# Patient Record
Sex: FEMALE | Race: WHITE
[De-identification: names, ages, dates, MRNs, and addresses within clinical notes are randomized per-mention and may not be internally consistent; named-entity substitution may affect disease eponyms.]

---

## 2022-05-09 ENCOUNTER — APPOINTMENT (OUTPATIENT)
Dept: ORTHOPEDIC SURGERY | Facility: CLINIC | Age: 72
End: 2022-05-09
Payer: COMMERCIAL

## 2022-05-09 VITALS — BODY MASS INDEX: 23.19 KG/M2 | WEIGHT: 126 LBS | HEIGHT: 62 IN

## 2022-05-09 DIAGNOSIS — S83.231A COMPLEX TEAR OF MEDIAL MENISCUS, CURRENT INJURY, RIGHT KNEE, INITIAL ENCOUNTER: ICD-10-CM

## 2022-05-09 DIAGNOSIS — I10 ESSENTIAL (PRIMARY) HYPERTENSION: ICD-10-CM

## 2022-05-09 PROBLEM — Z00.00 ENCOUNTER FOR PREVENTIVE HEALTH EXAMINATION: Status: ACTIVE | Noted: 2022-05-09

## 2022-05-09 PROCEDURE — 99213 OFFICE O/P EST LOW 20 MIN: CPT

## 2022-05-09 NOTE — HISTORY OF PRESENT ILLNESS
[5] : 5 [4] : 4 [Dull/Aching] : dull/aching [Localized] : localized [Occasional] : occasional [Retired] : Work status: retired [de-identified] : 05/09/22: Here to f/up right knee. Reports continued right knee pain. denies n/t. Reprots that pain can come and go, when present the pain alters her walking gait. Pain bothersome at night. Has been icing, resting.  Attending PT> \par 03/28/22: here for f/up for right knee. still with pain and discomfort. difficulty with steps. \par 3/22/22: Here to f/up right knee. After last visit she started experiencing worsening right knee pain, was seen in [facility] and received a csi without relief. [] : Post Surgical Visit: no [FreeTextEntry1] : R Knee [FreeTextEntry3] : NA Chronic [FreeTextEntry5] : Pt is a 71yr F in for eval of the right  knee, pt states NKI, pt states pain is chronic [de-identified] : 0 [de-identified] : Physical therapy 2x wkly\par CSI [de-identified] : [employer]

## 2022-05-09 NOTE — DISCUSSION/SUMMARY
[de-identified] : 71f with right knee djd, complex mmt. Persistent pain, has tried and failed csi. Has also attended physical therapy\par 1) request auth for series of visco injections for the right knee \par 2) c/w pt and hep \par 3) cryotherapy, rest and activity modification\par 4) rtc with auth for injections\par \par Entered by Gabrielle Mullins acting as scribe.\par

## 2022-05-09 NOTE — PHYSICAL EXAM
[Right] : right knee [NL (0)] : extension 0 degrees [] : no atrophy [TWNoteComboBox7] : flexion 125 degrees

## 2022-05-13 RX ORDER — SODIUM HYALURONATE INTRA-ARTICULAR SOLN PREF SYR 25 MG/2.5ML 25/2.5 MG/ML
25 SOLUTION PREFILLED SYRINGE INTRAARTICULAR WEEKLY
Qty: 5 | Refills: 0 | Status: ACTIVE | COMMUNITY
Start: 2022-05-13 | End: 1900-01-01

## 2022-06-16 ENCOUNTER — APPOINTMENT (OUTPATIENT)
Dept: ORTHOPEDIC SURGERY | Facility: CLINIC | Age: 72
End: 2022-06-16
Payer: COMMERCIAL

## 2022-06-16 VITALS — HEIGHT: 62 IN | BODY MASS INDEX: 21.9 KG/M2 | WEIGHT: 119 LBS

## 2022-06-16 PROCEDURE — 99212 OFFICE O/P EST SF 10 MIN: CPT | Mod: 25

## 2022-06-16 PROCEDURE — J3490M: CUSTOM

## 2022-06-16 PROCEDURE — 20611 DRAIN/INJ JOINT/BURSA W/US: CPT

## 2022-06-16 NOTE — PROCEDURE
[Large Joint Injection] : Large joint injection [Right] : of the right [Knee] : knee [Pain] : pain [Inflammation] : inflammation [X-ray evidence of Osteoarthritis on this or prior visit] : x-ray evidence of Osteoarthritis on this or prior visit [Betadine] : betadine [Ethyl Chloride sprayed topically] : ethyl chloride sprayed topically [Sterile technique used] : sterile technique used [Supartz] : Supartz [#1] : series #1 [Call if redness, pain or fever occur] : call if redness, pain or fever occur [Apply ice for 15min out of every hour for the next 12-24 hours as tolerated] : apply ice for 15 minutes out of every hour for the next 12-24 hours as tolerated [Previous OTC use and PT nontherapeutic] : patient has tried OTC's including aspirin, Ibuprofen, Aleve, etc or prescription NSAIDS, and/or exercises at home and/or physical therapy without satisfactory response [Patient had decreased mobility in the joint] : patient had decreased mobility in the joint [Risks, benefits, alternatives discussed / Verbal consent obtained] : the risks benefits, and alternatives have been discussed, and verbal consent was obtained [Prior failure or difficult injection] : prior failure or difficult injection

## 2022-06-16 NOTE — HISTORY OF PRESENT ILLNESS
[9] : 9 [4] : 4 [Dull/Aching] : dull/aching [Localized] : localized [Constant] : constant [Rest] : rest [Meds] : meds [Standing] : standing [Walking] : walking [Exercising] : exercising [Stairs] : stairs [Retired] : Work status: retired [1] : 1 [Supartz] : Supartz [de-identified] : 06/16/22: Here to f/up right knee and Supartz #1\par 05/09/22: Here to f/up right knee. Reports continued right knee pain. denies n/t. Reprots that pain can come and go, when present the pain alters her walking gait. Pain bothersome at night. Has been icing, resting.  Attending PT> \par 03/28/22: here for f/up for right knee. still with pain and discomfort. difficulty with steps. \par 3/22/22: Here to f/up right knee. After last visit she started experiencing worsening right knee pain, was seen in [facility] and received a csi without relief. [] : Post Surgical Visit: no [FreeTextEntry1] : R Knee [FreeTextEntry3] : NA Chronic [FreeTextEntry5] : Pt is a 71yr F in for eval of the right  knee, pt states NKI, pt states pain is chronic [de-identified] : ~3 weeks [de-identified] : Physical therapy 2x wkly up to 3 weeks ago\par  [de-identified] : [employer] [de-identified] : right knee

## 2022-06-16 NOTE — DISCUSSION/SUMMARY
[de-identified] : 71f with right knee djd, Supartz #1 Injection tolerated well. Post injection instructions reviewed.\par 1) wbat, cryotherapy\par 2) rtc 1 week\par \par Entered by Gabrielle Mullins acting as scribe.\par \par

## 2022-06-24 ENCOUNTER — APPOINTMENT (OUTPATIENT)
Dept: ORTHOPEDIC SURGERY | Facility: CLINIC | Age: 72
End: 2022-06-24
Payer: COMMERCIAL

## 2022-06-24 PROCEDURE — 99212 OFFICE O/P EST SF 10 MIN: CPT | Mod: 25

## 2022-06-24 PROCEDURE — 20611 DRAIN/INJ JOINT/BURSA W/US: CPT

## 2022-06-24 NOTE — DISCUSSION/SUMMARY
[de-identified] : 71f with right knee djd, Supartz #2 Injection tolerated well. Post injection instructions reviewed.\par 1) wbat, cryotherapy\par 2) rtc 1 week\par \par Entered by Gabrielle Mullins acting as scribe.\par \par

## 2022-06-24 NOTE — HISTORY OF PRESENT ILLNESS
[de-identified] : 06/24/22: Here to f/up right knee and Supartz #2\par 06/16/22: Here to f/up right knee and Supartz #1\par 05/09/22: Here to f/up right knee. Reports continued right knee pain. denies n/t. Reprots that pain can come and go, when present the pain alters her walking gait. Pain bothersome at night. Has been icing, resting.  Attending PT> \par 03/28/22: here for f/up for right knee. still with pain and discomfort. difficulty with steps. \par 3/22/22: Here to f/up right knee. After last visit she started experiencing worsening right knee pain, was seen in [facility] and received a csi without relief.

## 2022-06-24 NOTE — PROCEDURE
[Large Joint Injection] : Large joint injection [Right] : of the right [Knee] : knee [Pain] : pain [Inflammation] : inflammation [X-ray evidence of Osteoarthritis on this or prior visit] : x-ray evidence of Osteoarthritis on this or prior visit [Betadine] : betadine [Ethyl Chloride sprayed topically] : ethyl chloride sprayed topically [Sterile technique used] : sterile technique used [Supartz] : Supartz [#2] : series #2 [Call if redness, pain or fever occur] : call if redness, pain or fever occur [Apply ice for 15min out of every hour for the next 12-24 hours as tolerated] : apply ice for 15 minutes out of every hour for the next 12-24 hours as tolerated [Previous OTC use and PT nontherapeutic] : patient has tried OTC's including aspirin, Ibuprofen, Aleve, etc or prescription NSAIDS, and/or exercises at home and/or physical therapy without satisfactory response [Patient had decreased mobility in the joint] : patient had decreased mobility in the joint [Risks, benefits, alternatives discussed / Verbal consent obtained] : the risks benefits, and alternatives have been discussed, and verbal consent was obtained [Prior failure or difficult injection] : prior failure or difficult injection

## 2022-06-24 NOTE — PHYSICAL EXAM
[Right] : right knee [NL (0)] : extension 0 degrees [] : ligamentously stable [TWNoteComboBox7] : flexion 125 degrees

## 2022-06-30 ENCOUNTER — APPOINTMENT (OUTPATIENT)
Dept: ORTHOPEDIC SURGERY | Facility: CLINIC | Age: 72
End: 2022-06-30

## 2022-06-30 VITALS — WEIGHT: 119 LBS | HEIGHT: 62 IN | BODY MASS INDEX: 21.9 KG/M2

## 2022-06-30 PROCEDURE — 20610 DRAIN/INJ JOINT/BURSA W/O US: CPT

## 2022-06-30 PROCEDURE — 99212 OFFICE O/P EST SF 10 MIN: CPT | Mod: 25

## 2022-06-30 NOTE — HISTORY OF PRESENT ILLNESS
[0] : 0 [Occasional] : occasional [Injection therapy] : injection therapy [3] : 3 [Supartz] : Supartz [de-identified] : 6/30/22: Here for R knee Supartz #3\par 06/24/22: Here to f/up right knee and Supartz #2\par 06/16/22: Here to f/up right knee and Supartz #1\par 05/09/22: Here to f/up right knee. Reports continued right knee pain. denies n/t. Reprots that pain can come and go, when present the pain alters her walking gait. Pain bothersome at night. Has been icing, resting.  Attending PT> \par 03/28/22: here for f/up for right knee. still with pain and discomfort. difficulty with steps. \par 3/22/22: Here to f/up right knee. After last visit she started experiencing worsening right knee pain, was seen in [facility] and received a csi without relief. [] : Post Surgical Visit: no [FreeTextEntry1] : right knee [de-identified] : 6/24/22 [de-identified] : right knee [TWNoteComboBox1] : 90%

## 2022-06-30 NOTE — DISCUSSION/SUMMARY
[de-identified] : 71f with right knee djd, Supartz #3 Injection tolerated well. Post injection instructions reviewed.\par 1) wbat, cryotherapy\par 2) rtc 1 week\par \par Entered by Gabrielle Mullins acting as scribe.\par \par

## 2022-06-30 NOTE — PROCEDURE
[Large Joint Injection] : Large joint injection [Right] : of the right [Knee] : knee [Pain] : pain [Inflammation] : inflammation [X-ray evidence of Osteoarthritis on this or prior visit] : x-ray evidence of Osteoarthritis on this or prior visit [Betadine] : betadine [Ethyl Chloride sprayed topically] : ethyl chloride sprayed topically [Sterile technique used] : sterile technique used [Supartz] : Supartz [Call if redness, pain or fever occur] : call if redness, pain or fever occur [Apply ice for 15min out of every hour for the next 12-24 hours as tolerated] : apply ice for 15 minutes out of every hour for the next 12-24 hours as tolerated [Previous OTC use and PT nontherapeutic] : patient has tried OTC's including aspirin, Ibuprofen, Aleve, etc or prescription NSAIDS, and/or exercises at home and/or physical therapy without satisfactory response [Patient had decreased mobility in the joint] : patient had decreased mobility in the joint [Risks, benefits, alternatives discussed / Verbal consent obtained] : the risks benefits, and alternatives have been discussed, and verbal consent was obtained [Prior failure or difficult injection] : prior failure or difficult injection [#3] : series #3

## 2022-07-14 ENCOUNTER — APPOINTMENT (OUTPATIENT)
Dept: ORTHOPEDIC SURGERY | Facility: CLINIC | Age: 72
End: 2022-07-14
Payer: COMMERCIAL

## 2022-07-14 VITALS — WEIGHT: 119 LBS | BODY MASS INDEX: 21.9 KG/M2 | HEIGHT: 62 IN

## 2022-07-14 PROCEDURE — 72170 X-RAY EXAM OF PELVIS: CPT

## 2022-07-14 PROCEDURE — 20610 DRAIN/INJ JOINT/BURSA W/O US: CPT | Mod: RT

## 2022-07-14 PROCEDURE — 72100 X-RAY EXAM L-S SPINE 2/3 VWS: CPT

## 2022-07-14 PROCEDURE — 99213 OFFICE O/P EST LOW 20 MIN: CPT | Mod: 25

## 2022-07-14 NOTE — DISCUSSION/SUMMARY
[de-identified] : 71f with right knee djd, Supartz #4 Injection tolerated well. Post injection instructions reviewed.\par 1) wbat, cryotherapy\par 2) rtc 1 week\par \par L-spine scoli/ spondylosis\par 1)MRI \par 2)Pain mngmt follow up\par \par Entered by Gabrielle Mullins acting as scribe.\par \par

## 2022-07-14 NOTE — PROCEDURE
[Large Joint Injection] : Large joint injection [Right] : of the right [Knee] : knee [Pain] : pain [Inflammation] : inflammation [X-ray evidence of Osteoarthritis on this or prior visit] : x-ray evidence of Osteoarthritis on this or prior visit [Betadine] : betadine [Ethyl Chloride sprayed topically] : ethyl chloride sprayed topically [Sterile technique used] : sterile technique used [Supartz] : Supartz [Call if redness, pain or fever occur] : call if redness, pain or fever occur [Apply ice for 15min out of every hour for the next 12-24 hours as tolerated] : apply ice for 15 minutes out of every hour for the next 12-24 hours as tolerated [Previous OTC use and PT nontherapeutic] : patient has tried OTC's including aspirin, Ibuprofen, Aleve, etc or prescription NSAIDS, and/or exercises at home and/or physical therapy without satisfactory response [Patient had decreased mobility in the joint] : patient had decreased mobility in the joint [Risks, benefits, alternatives discussed / Verbal consent obtained] : the risks benefits, and alternatives have been discussed, and verbal consent was obtained [Prior failure or difficult injection] : prior failure or difficult injection [#4] : series #4

## 2022-07-14 NOTE — PHYSICAL EXAM
[Right] : right knee [NL (0)] : extension 0 degrees [Scoliosis] : Scoliosis [Spondylolysis] : Spondylolysis [AP] : anteroposterior [Mild arthritis (Tonnis Grade 1)] : Mild arthritis (Tonnis Grade 1) [] : no atrophy [TWNoteComboBox7] : flexion 125 degrees

## 2022-07-14 NOTE — HISTORY OF PRESENT ILLNESS
[0] : 0 [4] : 4 [Supartz] : Supartz [de-identified] : 7/14/22:  Here for R knee Supartz #4, back pain worsening \par 6/30/22: Here for R knee Supartz #3\par 06/24/22: Here to f/up right knee and Supartz #2\par 06/16/22: Here to f/up right knee and Supartz #1\par 05/09/22: Here to f/up right knee. Reports continued right knee pain. denies n/t. Reprots that pain can come and go, when present the pain alters her walking gait. Pain bothersome at night. Has been icing, resting.  Attending PT> \par 03/28/22: here for f/up for right knee. still with pain and discomfort. difficulty with steps. \par 3/22/22: Here to f/up right knee. After last visit she started experiencing worsening right knee pain, was seen in [facility] and received a csi without relief. [] : no [FreeTextEntry5] : 70 y/o RHD F Eval/INJ R Knee pt states NKI Chronic pain. Pt states all pain has subsided due to aide from Supartz INJ pt denies any pain today  [FreeTextEntry6] : pt denies any pain  [de-identified] : Armando #1-3 [de-identified] : 6/30/22 [de-identified] : R Knee [de-identified] : HA (Armando) [TWNoteComboBox1] : 100%

## 2022-07-18 ENCOUNTER — FORM ENCOUNTER (OUTPATIENT)
Age: 72
End: 2022-07-18

## 2022-07-19 ENCOUNTER — APPOINTMENT (OUTPATIENT)
Dept: MRI IMAGING | Facility: CLINIC | Age: 72
End: 2022-07-19

## 2022-07-19 PROCEDURE — 72148 MRI LUMBAR SPINE W/O DYE: CPT

## 2022-07-21 ENCOUNTER — APPOINTMENT (OUTPATIENT)
Dept: ORTHOPEDIC SURGERY | Facility: CLINIC | Age: 72
End: 2022-07-21

## 2022-07-21 VITALS — WEIGHT: 119 LBS | BODY MASS INDEX: 21.9 KG/M2 | HEIGHT: 62 IN

## 2022-07-21 PROCEDURE — 20611 DRAIN/INJ JOINT/BURSA W/US: CPT

## 2022-07-21 PROCEDURE — 99214 OFFICE O/P EST MOD 30 MIN: CPT | Mod: 25

## 2022-07-21 NOTE — DATA REVIEWED
[MRI] : MRI [Lumbar Spine] : lumbar spine [Report was reviewed and noted in the chart] : The report was reviewed and noted in the chart [I reviewed the films/CD] : I reviewed the films/CD [FreeTextEntry1] : 07.19.22\par 1. Mild cord compression and bilateral exiting nerve root impingement at T12-L1 and multilevel central \par stenosis and nerve root impingement in the lumbar spine particularly on the left at L4-L5 and L5-S1 and on the right at L1-L2 and L2-L3. There are scattered degenerative endplate and marrow signal changes, exaggerated lumbar lordosis, and anterolisthesis in the mid-to-lower lumbar spine without acute lumbar vertebral body fracture.\par 2. Findings suggesting multiple bilateral renal cysts incompletely evaluated on the current exam. Consider \par ultrasound of the kidneys to further evaluate as clinically indicated.

## 2022-07-21 NOTE — DISCUSSION/SUMMARY
[de-identified] : 71f with right knee djd, Supartz #5 Injection tolerated well. Post injection instructions reviewed. Also lumbar scoliosis, stenosis, ddd, spondylolisthesis\par 1) physical therapy for the lumbar spine\par 2) consult with pain management for possible injection therapy\par 3) cryotherapy, rest and activity modification\par \par Entered by Gabrielle Mullins acting as scribe.\par \par \par

## 2022-07-21 NOTE — PHYSICAL EXAM
[Scoliosis] : Scoliosis [Spondylolysis] : Spondylolysis [AP] : anteroposterior [Mild arthritis (Tonnis Grade 1)] : Mild arthritis (Tonnis Grade 1) [Right] : right knee [NL (0)] : extension 0 degrees [] : no atrophy [TWNoteComboBox7] : flexion 125 degrees

## 2022-07-21 NOTE — PROCEDURE
[Large Joint Injection] : Large joint injection [Right] : of the right [Knee] : knee [Pain] : pain [Inflammation] : inflammation [X-ray evidence of Osteoarthritis on this or prior visit] : x-ray evidence of Osteoarthritis on this or prior visit [Betadine] : betadine [Ethyl Chloride sprayed topically] : ethyl chloride sprayed topically [Sterile technique used] : sterile technique used [Supartz] : Supartz [Call if redness, pain or fever occur] : call if redness, pain or fever occur [Apply ice for 15min out of every hour for the next 12-24 hours as tolerated] : apply ice for 15 minutes out of every hour for the next 12-24 hours as tolerated [Previous OTC use and PT nontherapeutic] : patient has tried OTC's including aspirin, Ibuprofen, Aleve, etc or prescription NSAIDS, and/or exercises at home and/or physical therapy without satisfactory response [Patient had decreased mobility in the joint] : patient had decreased mobility in the joint [Risks, benefits, alternatives discussed / Verbal consent obtained] : the risks benefits, and alternatives have been discussed, and verbal consent was obtained [Prior failure or difficult injection] : prior failure or difficult injection [#5] : series #5

## 2022-07-21 NOTE — HISTORY OF PRESENT ILLNESS
[0] : 0 [5] : 5 [Supartz] : Supartz [de-identified] : 7/21/22: Here for R knee Supartz #5 Also here to review lumbar MRI results. \par 7/14/22:  Here for R knee Supartz #4, back pain worsening \par 6/30/22: Here for R knee Supartz #3\par 06/24/22: Here to f/up right knee and Supartz #2\par 06/16/22: Here to f/up right knee and Supartz #1\par 05/09/22: Here to f/up right knee. Reports continued right knee pain. denies n/t. Reprots that pain can come and go, when present the pain alters her walking gait. Pain bothersome at night. Has been icing, resting.  Attending PT> \par 03/28/22: here for f/up for right knee. still with pain and discomfort. difficulty with steps. \par 3/22/22: Here to f/up right knee. After last visit she started experiencing worsening right knee pain, was seen in [facility] and received a csi without relief. [] : no [FreeTextEntry5] : 70 y/o RHD F Eval/INJ R Knee pt states NKI Chronic pain. Pt states all pain has subsided due to aide from Supartz INJ\par  pt denies any pain today  [FreeTextEntry6] : pt denies any pain  [de-identified] : Armando # 4 [de-identified] : 07/14/22/22 [de-identified] : R Knee [de-identified] : HA (Armando) [TWNoteComboBox1] : 100%

## 2022-08-01 ENCOUNTER — APPOINTMENT (OUTPATIENT)
Dept: PAIN MANAGEMENT | Facility: CLINIC | Age: 72
End: 2022-08-01

## 2022-08-01 VITALS — HEIGHT: 62 IN | BODY MASS INDEX: 21.35 KG/M2 | WEIGHT: 116 LBS

## 2022-08-01 PROCEDURE — 99204 OFFICE O/P NEW MOD 45 MIN: CPT

## 2022-08-01 NOTE — DISCUSSION/SUMMARY
[de-identified] : After discussing various treatment options with the patient including but not limited to oral medications, physical therapy, exercise modalities as well as interventional spinal injections, we have decided with the following plan:\par \par - Continue Home exercises, stretching, activity modification, physical therapy, and conservative care.\par - MRI report and/or images was reviewed and discussed with the patient.\par - Recommend Right L4-5, L5-S1 Transforaminal Epidural Steroid Injection under fluoroscopic guidance with image.\par - The risks, benefits and alternatives of the proposed procedure were explained in detail with the patient. The risks outlined include but are not limited to infection, bleeding, post-dural puncture headache, nerve injury, a temporary increase in pain, failure to resolve symptoms, allergic reaction, symptom recurrence, and possible elevation of blood sugar in diabetics. All questions were answered to patient's apparent satisfaction and he/she verbalized an understanding.\par - Patient is presenting with acute/sub-acute radicular pain with impairment in ADLs and functionality.  The pain has not responded to conservative care including NSAID therapy and/or physical therapy.  There is no bleeding tendency, unstable medical condition, or systemic infection.\par - Follow up in 1-2 weeks post injection for re-evaluation.\par - Patient advised to follow-up with primary care physician / nephrologist for evaluation of renal lesion/cyst(s) found on MRI.\par

## 2022-08-01 NOTE — PHYSICAL EXAM
[de-identified] : Constitutional; Appears well, no apparent distress\par Ability to communicate: Normal \par Respiratory: non-labored breathing\par Skin: No rash noted\par Head: Normocephalic, atraumatic\par Neck: no visible thyroid enlargement\par Eyes: Extraocular movements intact\par Neurologic: Alert and oriented x3\par Psychiatric: normal mood, affect and behavior \par \par  [] : non-antalgic

## 2022-08-01 NOTE — HISTORY OF PRESENT ILLNESS
[Lower back] : lower back [7] : 7 [1] : 2 [Dull/Aching] : dull/aching [Radiating] : radiating [Intermittent] : intermittent [Household chores] : household chores [Leisure] : leisure [Sleep] : sleep [Injection therapy] : injection therapy [Standing] : standing [Walking] : walking [Lying in bed] : lying in bed [] : no [FreeTextEntry1] : right  [FreeTextEntry7] : right lateral thigh to the lateral shin  [de-identified] : 2012 [de-identified] : L MRI

## 2022-08-22 ENCOUNTER — APPOINTMENT (OUTPATIENT)
Dept: PAIN MANAGEMENT | Facility: CLINIC | Age: 72
End: 2022-08-22

## 2022-08-22 PROCEDURE — 64483 NJX AA&/STRD TFRM EPI L/S 1: CPT | Mod: RT

## 2022-08-22 PROCEDURE — 64484 NJX AA&/STRD TFRM EPI L/S EA: CPT | Mod: RT,59

## 2022-08-22 PROCEDURE — J3490M: CUSTOM

## 2022-08-22 NOTE — PROCEDURE
[FreeTextEntry3] : Date of Service: 08/22/2022 \par \par Account: [id number]\par \par Patient: MARANDA HOLLOWAY \par \par YOB: 1950\par \par Age: 71 year\par \par \par Surgeon:                                                          Benny Caceres D.O.\par \par Assistant:                                                         None.\par \par Pre-Operative Diagnosis:                             Lumbosacral radiculitis (M54.17)\par \par Post-Operative Diagnosis:                           Same\par \par Procedure:                                                      Right L4-5, L5-S1 transforaminal epidural steroid injection under fluoroscopic guidance.\par \par Anesthesia:                                                     Local with MAC\par \par \par This procedure was carried out using fluoroscopic guidance.  The risks and benefits of the procedure were discussed extensively with the patient.  The consent of the patient was obtained and the following procedure was performed. The patient was placed in the prone position on the fluoroscopic table and the lumbar area was prepped and draped in a sterile fashion. A timeout was performed with all essential staff present and the site and side were verified.\par \par The right L4-5, L5-S1 neural foramen were identified on right oblique "andre dog" anatomical view at the 6 o'clock position using fluoroscopic guidance, and the area was marked. The overlying skin and subcutaneous structures were anesthetized using sterile technique with 1% Lidocaine.  A 22-gauge spinal needle was directed toward the inferior (6 o'clock) position of the pedicle, which formed the roof of the identified foramen.  Once in the epidural space, after negative aspiration for heme and CSF, 1 cc of Omnipaque contrast was injected under live fluoroscopy to confirm epidural location and assess filling defects and rule out intravascular needle placement. \par \par The following contrast flow and epidurogram was observed: no intravascular or intrathecal flow pattern was noted.  No blood or CSF was aspirated. Contrast spread appeared to outline the right L4 and L5 nerve roots and spread medially into the epidural space.  \par \par After this, 3 ml of a total mixture of 12 mg betamethasone, 2 ml of preservative free normal saline, and 2 ml of 0.25% bupivacaine was administered without any resistance in the epidural space at each of the two levels. \par \par The needle was subsequently removed.  Vital signs remained normal.  Pulse oximeter was used throughout the procedure and the patient's pulse and oxygen saturation remained within normal limits.  The patient tolerated the procedure well.  There were no complications.  The patient was instructed to apply ice over the injection sites for twenty minutes every two hours for the next 24 to 48 hours.  The patient was also instructed to contact me immediately if there were any problems.\par \par Benny Caceres D.O.\par

## 2022-09-01 ENCOUNTER — APPOINTMENT (OUTPATIENT)
Dept: ORTHOPEDIC SURGERY | Facility: CLINIC | Age: 72
End: 2022-09-01

## 2022-09-08 ENCOUNTER — APPOINTMENT (OUTPATIENT)
Dept: ORTHOPEDIC SURGERY | Facility: CLINIC | Age: 72
End: 2022-09-08

## 2022-09-08 VITALS — WEIGHT: 116 LBS | HEIGHT: 62 IN | BODY MASS INDEX: 21.35 KG/M2

## 2022-09-08 DIAGNOSIS — M17.11 UNILATERAL PRIMARY OSTEOARTHRITIS, RIGHT KNEE: ICD-10-CM

## 2022-09-08 PROCEDURE — 99213 OFFICE O/P EST LOW 20 MIN: CPT

## 2022-09-08 NOTE — HISTORY OF PRESENT ILLNESS
[5] : 5 [2] : 2 [Dull/Aching] : dull/aching [Localized] : localized [Retired] : Work status: retired [de-identified] : 9/8/22: Here for R knee/ blow back follow up. Had LESI 2 wks ago, states good improvement with radicular symptoms in the LE. Reports some continued pain over the left paralumbar region pain and notes improvement with tingling over the right groin region.  Also s/p completing supartz injections for the right knee with relief. \par 7/21/22: Here for R knee Supartz #5 Also here to review lumbar MRI results. \par 7/14/22:  Here for R knee Supartz #4, back pain worsening \par 6/30/22: Here for R knee Supartz #3\par 06/24/22: Here to f/up right knee and Supartz #2\par 06/16/22: Here to f/up right knee and Supartz #1\par 05/09/22: Here to f/up right knee. Reports continued right knee pain. denies n/t. Reprots that pain can come and go, when present the pain alters her walking gait. Pain bothersome at night. Has been icing, resting.  Attending PT> \par 03/28/22: here for f/up for right knee. still with pain and discomfort. difficulty with steps. \par 3/22/22: Here to f/up right knee. After last visit she started experiencing worsening right knee pain, was seen in [facility] and received a csi without relief. [] : Post Surgical Visit: no [FreeTextEntry1] : L Spine [FreeTextEntry3] : 3/2022 [FreeTextEntry5] : 72 y/o F eval L spine NKI pain ongoing since march 2022. recent TX of physical therapy and epidural pt states condition has improved since last visit  [de-identified] : PT 2x wkly\par Epidural [de-identified] : [employer]

## 2022-09-08 NOTE — DISCUSSION/SUMMARY
[de-identified] : 71f with right knee djd.  Also lumbar scoliosis, stenosis, ddd, spondylolisthesis\par \par 1) continue to f/up with pain management\par 2) continue physical therapy and continue with home exercises\par 3) encouraged staying active. \par 4) rtc prn\par \par Entered by Gabrielle Mullins acting as scribe.\par \par \par

## 2022-09-12 ENCOUNTER — APPOINTMENT (OUTPATIENT)
Dept: PAIN MANAGEMENT | Facility: CLINIC | Age: 72
End: 2022-09-12

## 2022-09-12 VITALS — HEIGHT: 62 IN | BODY MASS INDEX: 21.35 KG/M2 | WEIGHT: 116 LBS

## 2022-09-12 PROCEDURE — 99214 OFFICE O/P EST MOD 30 MIN: CPT

## 2022-09-12 NOTE — DISCUSSION/SUMMARY
[de-identified] : After discussing various treatment options with the patient including but not limited to oral medications, physical therapy, exercise modalities as well as interventional spinal injections, we have decided with the following plan:\par \par - Continue Home exercises, stretching, activity modification, physical therapy, and conservative care.\par - MRI report and/or images was reviewed and discussed with the patient.\par - Recommend L5-S1 Lumbar Epidural Steroid Injection under fluoroscopic guidance with image. (RIGHT)\par - The risks, benefits and alternatives of the proposed procedure were explained in detail with the patient. The risks outlined include but are not limited to infection, bleeding, post-dural puncture headache, nerve injury, a temporary increase in pain, failure to resolve symptoms, allergic reaction, symptom recurrence, and possible elevation of blood sugar in diabetics. All questions were answered to patient's apparent satisfaction and he/she verbalized an understanding.\par - Patient is presenting with acute/sub-acute radicular pain with impairment in ADLs and functionality.  The pain has not responded to conservative care including NSAID therapy and/or physical therapy.  There is no bleeding tendency, unstable medical condition, or systemic infection.\par - Follow up in 1-2 weeks post injection for re-evaluation.\par

## 2022-09-12 NOTE — PHYSICAL EXAM
[de-identified] : Constitutional; Appears well, no apparent distress\par Ability to communicate: Normal \par Respiratory: non-labored breathing\par Skin: No rash noted\par Head: Normocephalic, atraumatic\par Neck: no visible thyroid enlargement\par Eyes: Extraocular movements intact\par Neurologic: Alert and oriented x3\par Psychiatric: normal mood, affect and behavior \par \par  [] : non-antalgic

## 2022-09-12 NOTE — HISTORY OF PRESENT ILLNESS
[Lower back] : lower back [6] : 6 [1] : 2 [Dull/Aching] : dull/aching [Radiating] : radiating [Intermittent] : intermittent [Household chores] : household chores [Leisure] : leisure [Sleep] : sleep [Injection therapy] : injection therapy [Standing] : standing [Walking] : walking [Lying in bed] : lying in bed [] : no [FreeTextEntry1] : right  [FreeTextEntry7] : right lateral thigh to the lateral shin  [de-identified] : 2012 [de-identified] : L MRI  [TWNoteComboBox1] : 60%

## 2022-10-04 ENCOUNTER — APPOINTMENT (OUTPATIENT)
Dept: PAIN MANAGEMENT | Facility: CLINIC | Age: 72
End: 2022-10-04

## 2022-10-04 PROCEDURE — 62323 NJX INTERLAMINAR LMBR/SAC: CPT

## 2022-10-04 NOTE — PROCEDURE
[FreeTextEntry3] : Date of Service: 10/04/2022 \par \par Account: [id number]\par \par Patient: MARANDA HOLLOWAY \par \par YOB: 1950\par \par Age: 71 year\par \par \par Surgeon:                                                         Benny Caceres D.O.\par \par Pre-Operative Diagnosis:                             Lumbosacral radiculitis\par \par Post-Operative Diagnosis:                           Same\par \par Procedure:                                                      Interlaminar lumbar epidural steroid injection (L5-S1) under fluoroscopic guidance\par \par Anesthesia:                                                     Local with MAC\par \par \par This procedure was carried out using fluoroscopic guidance.  The risks and benefits of the procedure were discussed extensively with the patient.  The consent of the patient was obtained and the following procedure was performed.\par \par The patient was placed in the prone position.  The lumbar area was prepped and draped in a sterile fashion.  A timeout was performed with all essential staff present and the site and side were verified. Under AP view with slight cephalad-caudad angulation, the L5-S1 interspace was identified and marked.  Using sterile technique, the superficial skin was anesthetized with 1% Lidocaine without epinephrine.  A 20-gauge Tuohy needle was advanced into the epidural space under fluoroscopy using llsiv-cfvfutshu-cvxut technique and using loss of resistance at the L5-S1 level.  After negative aspiration for heme or CSF, an epidurogram was obtained using 2-3 cc Omnipaque contrast injected under live fluoroscopy, confirming epidural placement of the needle.  \par \par Epidurogram showed no evidence of intrathecal or intravascular flow, and good evidence of bilateral epidural flow from L3-S2 levels.  After this, 4 cc of preservative free normal saline plus 12 mg of betamethasone were injected into the epidural space.\par \par The needle was subsequently removed.  Anesthesia personnel were present throughout the procedure.\par \par The patient tolerated the procedure well and was instructed to contact me immediately if there were any problems.\par \par Benny Caceres D.O.\par

## 2022-10-24 ENCOUNTER — APPOINTMENT (OUTPATIENT)
Dept: PAIN MANAGEMENT | Facility: CLINIC | Age: 72
End: 2022-10-24

## 2022-10-24 VITALS — BODY MASS INDEX: 21.35 KG/M2 | WEIGHT: 116 LBS | HEIGHT: 62 IN

## 2022-10-24 DIAGNOSIS — M54.50 LOW BACK PAIN, UNSPECIFIED: ICD-10-CM

## 2022-10-24 PROCEDURE — 99214 OFFICE O/P EST MOD 30 MIN: CPT

## 2022-10-24 NOTE — HISTORY OF PRESENT ILLNESS
[Lower back] : lower back [1] : 2 [Dull/Aching] : dull/aching [Radiating] : radiating [Intermittent] : intermittent [Household chores] : household chores [Leisure] : leisure [Sleep] : sleep [Injection therapy] : injection therapy [Standing] : standing [Walking] : walking [Lying in bed] : lying in bed [8] : 8 [Burning] : burning [Shooting] : shooting [] : no [FreeTextEntry1] : right  [FreeTextEntry7] : right lateral thigh to the lateral shin  [de-identified] : 2012 [de-identified] : L MRI  [TWNoteComboBox1] : 70%

## 2022-10-24 NOTE — DISCUSSION/SUMMARY
[de-identified] : After discussing various treatment options with the patient including but not limited to oral medications, physical therapy, exercise modalities as well as interventional spinal injections, we have decided with the following plan:\par \par - Continue Home exercises, stretching, activity modification, physical therapy, and conservative care.\par - MRI report and/or images was reviewed and discussed with the patient.\par - Recommend L5-S1 Lumbar Epidural Steroid Injection under fluoroscopic guidance with image. (RIGHT)\par - The risks, benefits and alternatives of the proposed procedure were explained in detail with the patient. The risks outlined include but are not limited to infection, bleeding, post-dural puncture headache, nerve injury, a temporary increase in pain, failure to resolve symptoms, allergic reaction, symptom recurrence, and possible elevation of blood sugar in diabetics. All questions were answered to patient's apparent satisfaction and he/she verbalized an understanding.\par - Patient is presenting with acute/sub-acute radicular pain with impairment in ADLs and functionality.  The pain has not responded to conservative care including NSAID therapy and/or physical therapy.  There is no bleeding tendency, unstable medical condition, or systemic infection.\par - Follow up in 1-2 weeks post injection for re-evaluation.\par

## 2022-10-24 NOTE — PHYSICAL EXAM
[de-identified] : Constitutional; Appears well, no apparent distress\par Ability to communicate: Normal \par Respiratory: non-labored breathing\par Skin: No rash noted\par Head: Normocephalic, atraumatic\par Neck: no visible thyroid enlargement\par Eyes: Extraocular movements intact\par Neurologic: Alert and oriented x3\par Psychiatric: normal mood, affect and behavior \par \par  [] : non-antalgic

## 2022-11-14 ENCOUNTER — APPOINTMENT (OUTPATIENT)
Dept: PAIN MANAGEMENT | Facility: CLINIC | Age: 72
End: 2022-11-14

## 2022-11-14 PROCEDURE — 62323 NJX INTERLAMINAR LMBR/SAC: CPT

## 2022-11-14 NOTE — PROCEDURE
[FreeTextEntry3] : Date of Service: 11/14/2022 \par \par Account: [id number]\par \par Patient: MARANDA HOLLOWAY \par \par YOB: 1950\par \par Age: 71 year\par \par \par Surgeon:                                                         Benny Caceres D.O.\par \par Pre-Operative Diagnosis:                             Lumbosacral radiculitis\par \par Post-Operative Diagnosis:                           Same\par \par Procedure:                                                      Interlaminar lumbar epidural steroid injection (L5-S1) under fluoroscopic guidance\par \par Anesthesia:                                                     Local with MAC\par \par \par This procedure was carried out using fluoroscopic guidance.  The risks and benefits of the procedure were discussed extensively with the patient.  The consent of the patient was obtained and the following procedure was performed.\par \par The patient was placed in the prone position.  The lumbar area was prepped and draped in a sterile fashion.  A timeout was performed with all essential staff present and the site and side were verified. Under AP view with slight cephalad-caudad angulation, the L5-S1 interspace was identified and marked.  Using sterile technique, the superficial skin was anesthetized with 1% Lidocaine without epinephrine.  A 20-gauge Tuohy needle was advanced into the epidural space under fluoroscopy using dcbie-ocnenazxu-caout technique and using loss of resistance at the L5-S1 level.  After negative aspiration for heme or CSF, an epidurogram was obtained using 2-3 cc Omnipaque contrast injected under live fluoroscopy, confirming epidural placement of the needle.  \par \par Epidurogram showed no evidence of intrathecal or intravascular flow, and good evidence of bilateral epidural flow from L3-S2 levels.  After this, 4 cc of preservative free normal saline plus 12 mg of betamethasone were injected into the epidural space.\par \par The needle was subsequently removed.  Anesthesia personnel were present throughout the procedure.\par \par The patient tolerated the procedure well and was instructed to contact me immediately if there were any problems.\par \par Benny Caceres D.O.\par

## 2022-11-28 ENCOUNTER — APPOINTMENT (OUTPATIENT)
Dept: PAIN MANAGEMENT | Facility: CLINIC | Age: 72
End: 2022-11-28

## 2022-11-28 VITALS — BODY MASS INDEX: 21.35 KG/M2 | WEIGHT: 116 LBS | HEIGHT: 62 IN

## 2022-11-28 DIAGNOSIS — M51.36 OTHER INTERVERTEBRAL DISC DEGENERATION, LUMBAR REGION: ICD-10-CM

## 2022-11-28 PROCEDURE — 99213 OFFICE O/P EST LOW 20 MIN: CPT

## 2022-11-28 NOTE — HISTORY OF PRESENT ILLNESS
[Lower back] : lower back [Dull/Aching] : dull/aching [Radiating] : radiating [Intermittent] : intermittent [Household chores] : household chores [Leisure] : leisure [Sleep] : sleep [Injection therapy] : injection therapy [Standing] : standing [Walking] : walking [Lying in bed] : lying in bed [4] : 4 [0] : 0 [] : no [FreeTextEntry1] : right  [FreeTextEntry7] : right shin  [de-identified] : 2012 [de-identified] : L MRI  [TWNoteComboBox1] : 70%

## 2022-11-28 NOTE — PHYSICAL EXAM
[de-identified] : Constitutional; Appears well, no apparent distress\par Ability to communicate: Normal \par Respiratory: non-labored breathing\par Skin: No rash noted\par Head: Normocephalic, atraumatic\par Neck: no visible thyroid enlargement\par Eyes: Extraocular movements intact\par Neurologic: Alert and oriented x3\par Psychiatric: normal mood, affect and behavior \par \par  [] : non-antalgic

## 2022-11-28 NOTE — DISCUSSION/SUMMARY
[de-identified] : After discussing various treatment options with the patient including but not limited to oral medications, physical therapy, exercise modalities as well as interventional spinal injections, we have decided with the following plan:\par \par - Continue home exercises, stretching, activity modification, physical therapy, and conservative care.\par - Follow-up as needed.\par - Recommend Tylenol 500-1000mg Q8 hours PRN.\par

## 2023-01-23 ENCOUNTER — APPOINTMENT (OUTPATIENT)
Dept: ORTHOPEDIC SURGERY | Facility: CLINIC | Age: 73
End: 2023-01-23
Payer: COMMERCIAL

## 2023-01-23 VITALS — WEIGHT: 122 LBS | HEIGHT: 62 IN | BODY MASS INDEX: 22.45 KG/M2

## 2023-01-23 DIAGNOSIS — M51.27 OTHER INTERVERTEBRAL DISC DISPLACEMENT, LUMBOSACRAL REGION: ICD-10-CM

## 2023-01-23 DIAGNOSIS — M41.26 OTHER IDIOPATHIC SCOLIOSIS, LUMBAR REGION: ICD-10-CM

## 2023-01-23 PROCEDURE — 99213 OFFICE O/P EST LOW 20 MIN: CPT

## 2023-01-23 PROCEDURE — 73502 X-RAY EXAM HIP UNI 2-3 VIEWS: CPT

## 2023-01-23 NOTE — DISCUSSION/SUMMARY
[de-identified] : 71f with right knee djd.  Also lumbar scoliosis, stenosis, ddd, spondylolisthesis. No fx on right hip x-ray s/p fall.\par \par 1) continue to f/up with pain management\par 2) physical therapy and continue with home exercises\par 3) encouraged staying active. \par 4) rtc prn\par \par Entered by Gabrielle Mullins acting as scribe.\par \par \par

## 2023-01-23 NOTE — PHYSICAL EXAM
[Scoliosis] : Scoliosis [Spondylolysis] : Spondylolysis [AP] : anteroposterior [Mild arthritis (Tonnis Grade 1)] : Mild arthritis (Tonnis Grade 1) [Right] : right hip [] : patient ambulates without assistive device

## 2023-01-23 NOTE — IMAGING
[AP] : anteroposterior [Lateral] : lateral [There are no fractures, subluxations or dislocations. No significant abnormalities are seen] : There are no fractures, subluxations or dislocations. No significant abnormalities are seen

## 2023-01-23 NOTE — HISTORY OF PRESENT ILLNESS
[9] : 9 [1] : 2 [Sharp] : sharp [Constant] : constant [Household chores] : household chores [Leisure] : leisure [Meds] : meds [Heat] : heat [Standing] : standing [Walking] : walking [de-identified] : 1/23/23: Here to f/up back and right hip, reports history of a fall about 4 weeks ago. Underwent LESI with pain management and has seen relief of back pain and radicular symptoms. Reports that since the fall she has been experiencing worsening right hip region pain.  Reports hip pain has been altering her walking gait. \par 9/8/22: Here for R knee/ blow back follow up. Had LESI 2 wks ago, states good improvement with radicular symptoms in the LE. Reports some continued pain over the left paralumbar region pain and notes improvement with tingling over the right groin region.  Also s/p completing supartz injections for the right knee with relief. \par 7/21/22: Here for R knee Supartz #5 Also here to review lumbar MRI results. \par 7/14/22:  Here for R knee Supartz #4, back pain worsening \par 6/30/22: Here for R knee Supartz #3\par 06/24/22: Here to f/up right knee and Supartz #2\par 06/16/22: Here to f/up right knee and Supartz #1\par 05/09/22: Here to f/up right knee. Reports continued right knee pain. denies n/t. Reprots that pain can come and go, when present the pain alters her walking gait. Pain bothersome at night. Has been icing, resting.  Attending PT> \par 03/28/22: here for f/up for right knee. still with pain and discomfort. difficulty with steps. \par 3/22/22: Here to f/up right knee. After last visit she started experiencing worsening right knee pain, was seen in [facility] and received a csi without relief. [] : no [FreeTextEntry1] : Right hip [FreeTextEntry5] : MARANDA HOLLOWAY is a 72 year old F here for an evaluation of the right him. Pt states that she had a bad fall about four weeks ago, and since then she's been having pain in her hip. Last Friday she had sharp pain that left her almost unable to walk.  [FreeTextEntry9] : Tylenol

## 2023-05-23 ENCOUNTER — OFFICE (OUTPATIENT)
Dept: URBAN - METROPOLITAN AREA CLINIC 34 | Facility: CLINIC | Age: 73
Setting detail: OPHTHALMOLOGY
End: 2023-05-23
Payer: COMMERCIAL

## 2023-05-23 DIAGNOSIS — H35.032: ICD-10-CM

## 2023-05-23 DIAGNOSIS — H35.031: ICD-10-CM

## 2023-05-23 DIAGNOSIS — H00.025: ICD-10-CM

## 2023-05-23 DIAGNOSIS — H35.40: ICD-10-CM

## 2023-05-23 DIAGNOSIS — H00.022: ICD-10-CM

## 2023-05-23 DIAGNOSIS — M06.9: ICD-10-CM

## 2023-05-23 DIAGNOSIS — H25.13: ICD-10-CM

## 2023-05-23 DIAGNOSIS — H35.363: ICD-10-CM

## 2023-05-23 DIAGNOSIS — H35.033: ICD-10-CM

## 2023-05-23 DIAGNOSIS — H16.221: ICD-10-CM

## 2023-05-23 PROCEDURE — 92014 COMPRE OPH EXAM EST PT 1/>: CPT | Performed by: OPHTHALMOLOGY

## 2023-05-23 ASSESSMENT — REFRACTION_CURRENTRX
OS_CYLINDER: +0.75
OS_VPRISM_DIRECTION: PROGS
OD_ADD: +1.25
OD_OVR_VA: 20/
OD_VPRISM_DIRECTION: PROGS
OS_VPRISM_DIRECTION: SV
OS_SPHERE: -2.25
OD_CYLINDER: +1.00
OD_OVR_VA: 20/
OD_AXIS: 163
OS_OVR_VA: 20/
OD_SPHERE: -2.25
OS_ADD: +1.25
OD_VPRISM_DIRECTION: SV
OD_SPHERE: -1.75
OS_SPHERE: -2.00
OS_AXIS: 008
OS_OVR_VA: 20/
OS_CYLINDER: +0.50
OD_AXIS: 168
OD_CYLINDER: +0.75
OS_AXIS: 018

## 2023-05-23 ASSESSMENT — SPHEQUIV_DERIVED
OD_SPHEQUIV: -0.75
OD_SPHEQUIV: -1.5
OS_SPHEQUIV: -1.5
OD_SPHEQUIV: -1.75
OS_SPHEQUIV: -1.5
OD_SPHEQUIV: -1.5
OS_SPHEQUIV: -2.375
OS_SPHEQUIV: -1.875

## 2023-05-23 ASSESSMENT — REFRACTION_AUTOREFRACTION
OD_SPHERE: -1.50
OD_AXIS: 170
OS_AXIS: 008
OS_CYLINDER: +0.50
OD_CYLINDER: +1.50
OS_SPHERE: -1.75

## 2023-05-23 ASSESSMENT — CONFRONTATIONAL VISUAL FIELD TEST (CVF)
OS_FINDINGS: FULL
OD_FINDINGS: FULL

## 2023-05-23 ASSESSMENT — REFRACTION_MANIFEST
OS_CYLINDER: +0.75
OD_CYLINDER: +1.00
OD_SPHERE: -2.00
OD_SPHERE: -2.00
OS_AXIS: 15
OD_ADD: +2.50
OD_SPHERE: -2.25
OS_VA1: 20/20
OD_VA1: 20/20
OS_ADD: +2.50
OD_CYLINDER: +1.00
OS_AXIS: 005
OD_AXIS: 170
OS_CYLINDER: +1.00
OS_SPHERE: -2.00
OS_VA1: 20/20
OS_CYLINDER: +0.75
OS_VA1: 20/20
OS_SPHERE: -2.75
OD_AXIS: 164
OD_VA1: 20/20-1
OS_AXIS: 026
OD_AXIS: 175
OD_VA1: 20/20
OD_CYLINDER: +1.00
OS_SPHERE: -2.25

## 2023-05-23 ASSESSMENT — TONOMETRY
OD_IOP_MMHG: 15
OS_IOP_MMHG: 14

## 2023-05-23 ASSESSMENT — KERATOMETRY
OD_AXISANGLE_DEGREES: 090
OD_K1POWER_DIOPTERS: 48.50
OS_AXISANGLE_DEGREES: 093
OS_K1POWER_DIOPTERS: 48.00
METHOD_AUTO_MANUAL: AUTO
OS_K2POWER_DIOPTERS: 48.25
OD_K2POWER_DIOPTERS: 48.50

## 2023-05-23 ASSESSMENT — AXIALLENGTH_DERIVED
OD_AL: 22.4029
OD_AL: 22.4029
OS_AL: 22.5278
OS_AL: 22.5278
OD_AL: 22.1424
OS_AL: 22.843
OD_AL: 22.4911
OS_AL: 22.6618

## 2023-05-23 ASSESSMENT — VISUAL ACUITY
OS_BCVA: 20/20-2
OD_BCVA: 20/20-1

## 2023-05-23 ASSESSMENT — LID EXAM ASSESSMENTS
OS_MEIBOMITIS: LLL 1+ 2+
OD_MEIBOMITIS: RLL 1+
OS_COMMENTS: SHORTENED/MISSING GLANDS
OD_COMMENTS: SHORTENED/MISSING GLANDS

## 2024-05-28 ENCOUNTER — OFFICE (OUTPATIENT)
Dept: URBAN - METROPOLITAN AREA CLINIC 34 | Facility: CLINIC | Age: 74
Setting detail: OPHTHALMOLOGY
End: 2024-05-28
Payer: COMMERCIAL

## 2024-05-28 DIAGNOSIS — H35.363: ICD-10-CM

## 2024-05-28 PROCEDURE — 92014 COMPRE OPH EXAM EST PT 1/>: CPT | Performed by: OPHTHALMOLOGY

## 2024-05-28 ASSESSMENT — CONFRONTATIONAL VISUAL FIELD TEST (CVF)
OD_FINDINGS: FULL
OS_FINDINGS: FULL

## 2024-07-01 ENCOUNTER — APPOINTMENT (OUTPATIENT)
Dept: PAIN MANAGEMENT | Facility: CLINIC | Age: 74
End: 2024-07-01
Payer: COMMERCIAL

## 2024-07-01 VITALS — WEIGHT: 122 LBS | BODY MASS INDEX: 22.45 KG/M2 | HEIGHT: 62 IN

## 2024-07-01 DIAGNOSIS — M54.17 RADICULOPATHY, LUMBOSACRAL REGION: ICD-10-CM

## 2024-07-01 DIAGNOSIS — M43.16 SPONDYLOLISTHESIS, LUMBAR REGION: ICD-10-CM

## 2024-07-01 PROCEDURE — 99214 OFFICE O/P EST MOD 30 MIN: CPT

## 2024-07-29 ENCOUNTER — APPOINTMENT (OUTPATIENT)
Dept: PAIN MANAGEMENT | Facility: CLINIC | Age: 74
End: 2024-07-29
Payer: COMMERCIAL

## 2024-07-29 DIAGNOSIS — M54.17 RADICULOPATHY, LUMBOSACRAL REGION: ICD-10-CM

## 2024-07-29 PROCEDURE — 64483 NJX AA&/STRD TFRM EPI L/S 1: CPT | Mod: LT

## 2024-07-29 PROCEDURE — 64484 NJX AA&/STRD TFRM EPI L/S EA: CPT | Mod: LT,59

## 2024-07-29 PROCEDURE — J3490M: CUSTOM

## 2024-07-29 NOTE — PROCEDURE
[FreeTextEntry3] : Date of Service: 07/29/2024   Account: [id number]  Patient: MARANDA HOLLOWAY   YOB: 1950  Age: 73 year   Surgeon:                                                        Benny Caceres D.O.  Assistant:                                                        None  Pre-Operative Diagnosis:                            Lumbosacral radiculitis (M54.17)  Post-Operative Diagnosis:                          Same  Procedure:                                                     Left L4-5, L5-S1, transforaminal epidural steroid injection under fluoroscopic guidance.  Anesthesia:                                                    Local with MAC  This procedure was carried out using fluoroscopic guidance.  The risks and benefits of the procedure were discussed extensively with the patient.  The consent of the patient was obtained and the following procedure was performed. The patient was placed in the prone position on the fluoroscopic table and the lumbar area was prepped and draped in a sterile fashion. A timeout was performed with all essential staff present and the site and side were verified.  The Left L4-5, L5-S1 neural foramen were identified on left oblique "andre dog" anatomical view at the 6 o'clock position using fluoroscopic guidance, and the area was marked. The overlying skin and subcutaneous structures were anesthetized using sterile technique with 1% Lidocaine.  A 22-gauge spinal needle was directed toward the inferior (6 o'clock) position of the pedicle, which formed the roof of the identified foramen.  Once in the epidural space, after negative aspiration for heme and CSF, 1cc of Omnipaque contrast was injected under live fluoroscopy to confirm epidural location and assess filling defects and rule out intravascular needle placement.   The following contrast flow and epidurogram was observed: no intravascular or intrathecal flow pattern was noted.  No blood or CSF was aspirated. Contrast spread appeared to outline the left L4 and L5 nerve roots and spread medially into the epidural space.    After this, 3 ml of a total mixture of 12 mg betamethasone, 2 ml of preservative free normal saline, and 2 ml of 0.25% bupivacaine was administered without any resistance in the epidural space at each of the two levels.   The needle was subsequently removed.  Vital signs remained normal.  Pulse oximeter was used throughout the procedure and the patient's pulse and oxygen saturation remained within normal limits.  The patient tolerated the procedure well.  There were no complications.  The patient was instructed to apply ice over the injection sites for twenty minutes every two hours for the next 24 to 48 hours.  The patient was also instructed to contact me immediately if there were any problems.  Benny Caceres D.O.

## 2024-08-12 ENCOUNTER — APPOINTMENT (OUTPATIENT)
Dept: PAIN MANAGEMENT | Facility: CLINIC | Age: 74
End: 2024-08-12
Payer: COMMERCIAL

## 2024-08-12 VITALS — BODY MASS INDEX: 21.16 KG/M2 | WEIGHT: 115 LBS | HEIGHT: 62 IN

## 2024-08-12 DIAGNOSIS — M51.27 OTHER INTERVERTEBRAL DISC DISPLACEMENT, LUMBOSACRAL REGION: ICD-10-CM

## 2024-08-12 DIAGNOSIS — M54.12 RADICULOPATHY, CERVICAL REGION: ICD-10-CM

## 2024-08-12 DIAGNOSIS — M54.17 RADICULOPATHY, LUMBOSACRAL REGION: ICD-10-CM

## 2024-08-12 DIAGNOSIS — M54.2 CERVICALGIA: ICD-10-CM

## 2024-08-12 DIAGNOSIS — M51.36 OTHER INTERVERTEBRAL DISC DEGENERATION, LUMBAR REGION: ICD-10-CM

## 2024-08-12 PROCEDURE — 99213 OFFICE O/P EST LOW 20 MIN: CPT

## 2024-08-12 NOTE — HISTORY OF PRESENT ILLNESS
[Lower back] : lower back [4] : 4 [0] : 0 [Dull/Aching] : dull/aching [Radiating] : radiating [Household chores] : household chores [Leisure] : leisure [Sleep] : sleep [Injection therapy] : injection therapy [Standing] : standing [Walking] : walking [Lying in bed] : lying in bed [Occasional] : occasional [] : no [FreeTextEntry1] : right  [FreeTextEntry7] : right shin  [de-identified] : 2012 [de-identified] : L MRI  [TWNoteComboBox1] : 100%

## 2024-08-12 NOTE — DISCUSSION/SUMMARY
[de-identified] : After discussing various treatment options with the patient including but not limited to oral medications, physical therapy, exercise modalities as well as interventional spinal injections, we have decided with the following plan:  - Continue home exercises, stretching, activity modification, physical therapy, and conservative care. - Follow-up as needed. - Recommend Tylenol 500-1000mg Q8 hours PRN. - Can consider MRI C-spine if pain worsens.

## 2024-08-12 NOTE — PHYSICAL EXAM
[de-identified] : Constitutional; Appears well, no apparent distress\par  Ability to communicate: Normal \par  Respiratory: non-labored breathing\par  Skin: No rash noted\par  Head: Normocephalic, atraumatic\par  Neck: no visible thyroid enlargement\par  Eyes: Extraocular movements intact\par  Neurologic: Alert and oriented x3\par  Psychiatric: normal mood, affect and behavior \par  \par   [] : non-antalgic

## 2025-05-13 ENCOUNTER — DOCTOR'S OFFICE (OUTPATIENT)
Facility: LOCATION | Age: 75
Setting detail: OPHTHALMOLOGY
End: 2025-05-13
Payer: COMMERCIAL

## 2025-05-13 VITALS — HEIGHT: 55 IN

## 2025-05-13 DIAGNOSIS — H00.025: ICD-10-CM

## 2025-05-13 DIAGNOSIS — H35.363: ICD-10-CM

## 2025-05-13 DIAGNOSIS — H00.022: ICD-10-CM

## 2025-05-13 DIAGNOSIS — H25.13: ICD-10-CM

## 2025-05-13 PROBLEM — H16.223 DRY EYE SYNDROME K SICCA; BOTH EYES: Status: ACTIVE | Noted: 2025-05-13

## 2025-05-13 PROCEDURE — 92014 COMPRE OPH EXAM EST PT 1/>: CPT | Performed by: OPHTHALMOLOGY

## 2025-05-13 ASSESSMENT — REFRACTION_CURRENTRX
OS_SPHERE: -2.25
OD_CYLINDER: +1.00
OD_SPHERE: -1.75
OD_AXIS: 163
OS_CYLINDER: +0.50
OD_VPRISM_DIRECTION: SV
OS_VPRISM_DIRECTION: SV
OS_OVR_VA: 20/
OD_ADD: +1.25
OD_ADD: +2.25
OS_ADD: +2.25
OD_OVR_VA: 20/
OS_OVR_VA: 20/
OD_AXIS: 173
OS_VPRISM_DIRECTION: PROGS
OS_AXIS: 018
OS_SPHERE: -2.00
OD_CYLINDER: +1.00
OD_OVR_VA: 20/
OD_VPRISM_DIRECTION: PROGS
OS_SPHERE: -2.25
OD_OVR_VA: 20/
OS_ADD: +1.25
OS_AXIS: 008
OS_OVR_VA: 20/
OD_SPHERE: -2.25
OS_AXIS: 009
OD_AXIS: 168
OD_CYLINDER: +0.75
OS_CYLINDER: +0.75
OS_CYLINDER: +0.50
OD_SPHERE: -2.00

## 2025-05-13 ASSESSMENT — REFRACTION_MANIFEST
OS_CYLINDER: +0.75
OS_SPHERE: -2.25
OD_AXIS: 170
OS_SPHERE: -2.75
OD_VA1: 20/20-1
OD_SPHERE: -2.00
OD_VA1: 20/20
OD_AXIS: 170
OS_CYLINDER: +0.50
OD_VA1: 20/20
OS_CYLINDER: +0.75
OS_ADD: +2.50
OS_SPHERE: -2.00
OS_AXIS: 026
OD_CYLINDER: +1.25
OS_AXIS: 15
OS_AXIS: 010
OD_ADD: +2.50
OD_SPHERE: -2.00
OS_VA1: 20/20
OS_CYLINDER: +1.00
OD_VA1: 20/30
OS_VA1: 20/20
OS_VA1: 20/25
OD_CYLINDER: +1.00
OS_AXIS: 005
OS_VA1: 20/20
OD_SPHERE: +2.00
OD_SPHERE: -2.25
OD_CYLINDER: +1.00
OD_AXIS: 164
OD_AXIS: 175
OD_CYLINDER: +1.00
OS_SPHERE: -2.00

## 2025-05-13 ASSESSMENT — REFRACTION_AUTOREFRACTION
OD_SPHERE: -1.50
OD_CYLINDER: +1.25
OD_AXIS: 171
OS_SPHERE: -1.75
OS_CYLINDER: +0.50
OS_AXIS: 007

## 2025-05-13 ASSESSMENT — CONFRONTATIONAL VISUAL FIELD TEST (CVF)
OS_FINDINGS: FULL
OD_FINDINGS: FULL

## 2025-05-13 ASSESSMENT — KERATOMETRY
METHOD_AUTO_MANUAL: AUTO
OS_K1POWER_DIOPTERS: 47.75
OD_AXISANGLE_DEGREES: 164
OD_K2POWER_DIOPTERS: 48.50
OS_K2POWER_DIOPTERS: 48.75
OS_AXISANGLE_DEGREES: 095
OD_K1POWER_DIOPTERS: 48.25

## 2025-05-13 ASSESSMENT — SUPERFICIAL PUNCTATE KERATITIS (SPK)
OD_SPK: 1+
OS_SPK: 1+

## 2025-05-13 ASSESSMENT — DRY EYES - PHYSICIAN NOTES
OD_GENERALCOMMENTS: INF LIMBUS
OS_GENERALCOMMENTS: INF LIMBUS

## 2025-05-13 ASSESSMENT — LID EXAM ASSESSMENTS
OS_COMMENTS: SHORTENED/MISSING GLANDS
OS_MEIBOMITIS: LLL 1+ 2+
OD_MEIBOMITIS: RLL 1+
OD_COMMENTS: SHORTENED/MISSING GLANDS

## 2025-05-13 ASSESSMENT — TONOMETRY
OD_IOP_MMHG: 14
OS_IOP_MMHG: 12

## 2025-05-13 ASSESSMENT — VISUAL ACUITY
OD_BCVA: 20/30-1
OS_BCVA: 20/30-2

## 2025-06-03 ENCOUNTER — APPOINTMENT (OUTPATIENT)
Dept: ORTHOPEDIC SURGERY | Facility: CLINIC | Age: 75
End: 2025-06-03
Payer: COMMERCIAL

## 2025-06-03 VITALS — BODY MASS INDEX: 21.16 KG/M2 | HEIGHT: 62 IN | WEIGHT: 115 LBS

## 2025-06-03 DIAGNOSIS — M17.11 UNILATERAL PRIMARY OSTEOARTHRITIS, RIGHT KNEE: ICD-10-CM

## 2025-06-03 DIAGNOSIS — S83.231A COMPLEX TEAR OF MEDIAL MENISCUS, CURRENT INJURY, RIGHT KNEE, INITIAL ENCOUNTER: ICD-10-CM

## 2025-06-03 PROCEDURE — 73564 X-RAY EXAM KNEE 4 OR MORE: CPT | Mod: RT

## 2025-06-03 PROCEDURE — 99214 OFFICE O/P EST MOD 30 MIN: CPT

## 2025-06-03 RX ORDER — MELOXICAM 15 MG/1
15 TABLET ORAL
Qty: 30 | Refills: 1 | Status: ACTIVE | COMMUNITY
Start: 2025-06-03 | End: 1900-01-01

## 2025-06-03 RX ORDER — AMLODIPINE BESYLATE 5 MG/1
TABLET ORAL
Refills: 0 | Status: ACTIVE | COMMUNITY

## 2025-06-03 RX ORDER — FAMOTIDINE 10 MG/1
TABLET, FILM COATED ORAL
Refills: 0 | Status: ACTIVE | COMMUNITY

## 2025-06-03 RX ORDER — OLMESARTAN MEDOXOMIL 40 MG/1
TABLET, FILM COATED ORAL
Refills: 0 | Status: ACTIVE | COMMUNITY

## 2025-06-03 RX ORDER — LEFLUNOMIDE 10 MG/1
TABLET, FILM COATED ORAL
Refills: 0 | Status: ACTIVE | COMMUNITY

## 2025-08-12 ENCOUNTER — RX RENEWAL (OUTPATIENT)
Age: 75
End: 2025-08-12